# Patient Record
Sex: FEMALE | URBAN - METROPOLITAN AREA
[De-identification: names, ages, dates, MRNs, and addresses within clinical notes are randomized per-mention and may not be internally consistent; named-entity substitution may affect disease eponyms.]

---

## 2017-04-24 ENCOUNTER — APPOINTMENT (OUTPATIENT)
Dept: URBAN - METROPOLITAN AREA CLINIC 282 | Age: 72
Setting detail: DERMATOLOGY
End: 2017-05-07

## 2017-04-24 DIAGNOSIS — D22 MELANOCYTIC NEVI: ICD-10-CM

## 2017-04-24 DIAGNOSIS — L85.3 XEROSIS CUTIS: ICD-10-CM

## 2017-04-24 DIAGNOSIS — D18.0 HEMANGIOMA: ICD-10-CM

## 2017-04-24 DIAGNOSIS — L82.1 OTHER SEBORRHEIC KERATOSIS: ICD-10-CM

## 2017-04-24 DIAGNOSIS — L81.4 OTHER MELANIN HYPERPIGMENTATION: ICD-10-CM

## 2017-04-24 DIAGNOSIS — L21.8 OTHER SEBORRHEIC DERMATITIS: ICD-10-CM

## 2017-04-24 PROBLEM — D18.01 HEMANGIOMA OF SKIN AND SUBCUTANEOUS TISSUE: Status: ACTIVE | Noted: 2017-04-24

## 2017-04-24 PROBLEM — D22.9 MELANOCYTIC NEVI, UNSPECIFIED: Status: ACTIVE | Noted: 2017-04-24

## 2017-04-24 PROCEDURE — OTHER OTHER: OTHER

## 2017-04-24 PROCEDURE — 99213 OFFICE O/P EST LOW 20 MIN: CPT

## 2017-04-24 PROCEDURE — OTHER COUNSELING: OTHER

## 2017-04-24 PROCEDURE — OTHER REASSURANCE: OTHER

## 2017-04-24 ASSESSMENT — LOCATION DETAILED DESCRIPTION DERM
LOCATION DETAILED: RIGHT RIB CAGE
LOCATION DETAILED: UPPER STERNUM
LOCATION DETAILED: LEFT ANTERIOR SHOULDER
LOCATION DETAILED: INFERIOR THORACIC SPINE
LOCATION DETAILED: MID-FRONTAL SCALP
LOCATION DETAILED: RIGHT MEDIAL SUPERIOR CHEST
LOCATION DETAILED: LEFT RIB CAGE

## 2017-04-24 ASSESSMENT — LOCATION SIMPLE DESCRIPTION DERM
LOCATION SIMPLE: ABDOMEN
LOCATION SIMPLE: CHEST
LOCATION SIMPLE: UPPER BACK
LOCATION SIMPLE: ANTERIOR SCALP
LOCATION SIMPLE: LEFT SHOULDER

## 2017-04-24 ASSESSMENT — LOCATION ZONE DERM
LOCATION ZONE: SCALP
LOCATION ZONE: TRUNK
LOCATION ZONE: ARM

## 2017-04-24 NOTE — PROCEDURE: OTHER
Other (Free Text): Apply heavy moisturizer few times daily
Note Text (......Xxx Chief Complaint.): This diagnosis correlates with the
Detail Level: Detailed
Other (Free Text): Patient uses Head and Shoulders every other day, advised to use every day until under control then can return to every other day

## 2017-04-24 NOTE — PROCEDURE: REASSURANCE
Additional Note: Lesion pointed out by patient on left upper thigh.
Detail Level: Detailed
Include Location In Plan?: No